# Patient Record
(demographics unavailable — no encounter records)

---

## 2025-01-14 NOTE — REASON FOR VISIT
[Follow-Up] : a follow-up evaluation of [Home] : at home, [unfilled] , at the time of the visit. [Medical Office: ([facility])___] : at the medical office located in  [Patient] : the patient

## 2025-01-14 NOTE — REASON FOR VISIT
[Follow-Up] : a follow-up evaluation of [Home] : at home, [unfilled] , at the time of the visit. [Medical Office: ([facility])___] : at the medical office located in  [Patient] : the patient

## 2025-01-15 NOTE — HISTORY OF PRESENT ILLNESS
[FreeTextEntry1] : perimenopausal disorder--muscles and tendons tight and hard to get out of bed,cant stay asleep,agitated/upset,hot at night.Lmp Nov 2024

## 2025-01-15 NOTE — REVIEW OF SYSTEMS
[Fatigue] : fatigue [Night Sweats] : night sweats [Joint Stiffness] : joint stiffness [Sleep Disturbances] : sleep disturbances [Negative] : Heme/Lymph

## 2025-01-15 NOTE — PLAN
[FreeTextEntry1] : Differential diagnosis, work up ,management and evaluation of above mentioned concerns extensively reviewed Extensive review of above mentioned concerns. All questions and concerns addressed, patient expressed understanding. Encouraged to contact the office with any questions or concerns.  Discussed treatment options for menopausal sx including hot flashes and vaginal dryness.  Discussed SSRIs, topical non-hormonal and hormonal medications and HRT.  Rev HRT   R/B/A  including but not limited to increased MI,CVA, VTE risk and breast cancer risk. When have a uterus must use progestins. Unopposed estrogen may cause endometrial hyperplasia /cancer Aware still ovulating and may have irreg bleeding--suggesyt if an issue rec progestin IUD to manage